# Patient Record
Sex: MALE | Race: OTHER | HISPANIC OR LATINO
[De-identification: names, ages, dates, MRNs, and addresses within clinical notes are randomized per-mention and may not be internally consistent; named-entity substitution may affect disease eponyms.]

---

## 2024-01-05 ENCOUNTER — APPOINTMENT (OUTPATIENT)
Dept: PAIN MANAGEMENT | Facility: CLINIC | Age: 34
End: 2024-01-05

## 2024-01-05 PROBLEM — Z00.00 ENCOUNTER FOR PREVENTIVE HEALTH EXAMINATION: Status: ACTIVE | Noted: 2024-01-05

## 2024-01-09 ENCOUNTER — APPOINTMENT (OUTPATIENT)
Dept: PAIN MANAGEMENT | Facility: CLINIC | Age: 34
End: 2024-01-09
Payer: MEDICAID

## 2024-01-09 VITALS
HEIGHT: 71 IN | SYSTOLIC BLOOD PRESSURE: 140 MMHG | DIASTOLIC BLOOD PRESSURE: 79 MMHG | WEIGHT: 300 LBS | OXYGEN SATURATION: 98 % | BODY MASS INDEX: 42 KG/M2 | HEART RATE: 76 BPM

## 2024-01-09 PROCEDURE — 20553 NJX 1/MLT TRIGGER POINTS 3/>: CPT

## 2024-01-09 PROCEDURE — 99203 OFFICE O/P NEW LOW 30 MIN: CPT | Mod: 25

## 2024-01-09 RX ORDER — MELOXICAM 10 MG/1
10 CAPSULE ORAL
Refills: 0 | Status: ACTIVE | COMMUNITY

## 2024-01-09 RX ORDER — OXYCODONE HYDROCHLORIDE AND ACETAMINOPHEN 5; 325 MG/1; MG/1
5-325 TABLET ORAL
Refills: 0 | Status: ACTIVE | COMMUNITY

## 2024-01-19 ENCOUNTER — APPOINTMENT (OUTPATIENT)
Dept: PAIN MANAGEMENT | Facility: CLINIC | Age: 34
End: 2024-01-19
Payer: MEDICAID

## 2024-01-19 PROCEDURE — 99213 OFFICE O/P EST LOW 20 MIN: CPT | Mod: 95

## 2024-01-19 NOTE — REVIEW OF SYSTEMS
[Back Pain] : back pain [Muscle Pain] : muscle pain [Joint Pain] : joint pain [Decreased ROM] : decreased range of motion

## 2024-01-19 NOTE — REASON FOR VISIT
[Home] : at home, [unfilled] , at the time of the visit. [Medical Office: ([facility])___] : at the medical office located in  [Patient] : the patient [Follow-Up Visit] : a follow-up pain management visit

## 2024-01-22 NOTE — PHYSICAL EXAM
[de-identified] : Physical Exam (Telemedicine):  Gen: AAOX3, NAD HEENT: PERRLA, EOMI, NCAT, NP Pulmonary: No Signs of Respiratory Distress MSK: AROM WFL, Limitations painful truncal ROM Neurological: Grossly neurologically intact Positive modified seated SLR on RIGHT Gait: Normal, Non-antalgic, Sans AD Derm: No Rash, (-)Lesions, (-)Erythema, (-)Cyanosis  Psych: Calm, Cooperative, Conversational  Disclaimer: This is a limited examination for the purposes of conducting a telemedicine visit during the COVID-19 pandemic. Physical exam maneuvers were modified as necessary to allow patient to self perform. A focused physician physical examination will be performed during in person visits and should be referred to to determine need for further testing, interventions or degree of disability.

## 2024-01-22 NOTE — HISTORY OF PRESENT ILLNESS
[Back Pain] : back pain [FreeTextEntry1] : Interval Note:  Since last visit the pain intensity has progressed, expressing frustration  TPI last visit helped his ROM but still with pain with standing and walking  He has completed greater than 6 weeks of physician directed home exercise regiment targeting the lumbar paraspinal and quadratus lumborum muscles, hamstrings, that was provided to him in the past, mid November 2023  MRI has been denied  Moving bowels regularly. No recent falls.   Patient denies any bowel/bladder incontinence, no saddle/perineal anesthesia or any other red flag signs or symptoms.  ---  HPI - Mr. TYREE ROBLES is a 33 year M with PHx as below, referred by FAMILY who presents today with chief complaint of low back pain. Reports that it developed several years ago. It is located lumbar spine;  there is radiation of the pain into the right leg.  The pain is presently 5/10 in severity on the numerical rating scale. It is sharp in nature. The pain is constant. There is diurnal worsening, during the course of the day. The pain is aggravated with standing, walking, bending, lifting twisting. The pain improves with rest. The pain is functionally and emotionally disabling for the patient as its preventing them from going about activities of daily living, such as routine housework. The pain does impair the patients ability to sleep.    Patient attests to 6 weeks of provider directed treatment, including a provider directed stretching regimen, cyclobenzaprine  Patient reports treatment that occurred within the last 3 months  Patient report that symptoms have been persistent and and progressing after treatment    Reports there is NO present numbness, there is NO weakness. Patient denies any bowel/bladder incontinence, no saddle/perineal anesthesia or any other red flag signs or symptoms. Reports regular BMs.

## 2024-01-22 NOTE — DATA REVIEWED
[FreeTextEntry1] : See Media Section  CT lumbar spine L3-4 there appears to be some mild spinal canal stenosis due to broad-based bulging   L4-L5 there is what appears to be a moderate spinal canal stenosis secondary to broad-based disc bulging also appears to be right neuroforaminal stenosis  Impression: No acute lumbar spine process Bilateral adrenal adenomas.

## 2024-01-22 NOTE — ASSESSMENT
[FreeTextEntry1] : Mr. TYREE ROBLES is a 33 year M suffering from low back and RIGHT leg pain, that based upon today's subjective complaints, physical examination, and CT review, is likely secondary to lumbar radiculopathy >> Medications  Chronic opioid use for non-malignant pain, in particular at high doses would not be recommended since it can potentially lead to hyperalgesia (hypersensitivity), tolerance and addiction.    Not amenable   >> Interventions   Consider for Right L5/S1 ILESI   >> Therapy and Other Modalities   Continue with home exercise regiment as tolerated  >> Imaging and Other Studies  MRI Lumbar Spine - remains pending  >> Consults  None ordered

## 2024-02-02 ENCOUNTER — APPOINTMENT (OUTPATIENT)
Dept: PAIN MANAGEMENT | Facility: CLINIC | Age: 34
End: 2024-02-02
Payer: MEDICAID

## 2024-02-02 PROCEDURE — 99212 OFFICE O/P EST SF 10 MIN: CPT

## 2024-02-02 NOTE — ASSESSMENT
[FreeTextEntry1] : Mr. TYREE ROBLES is a 33 year M suffering from low back and RIGHT leg pain, that based upon today's subjective complaints, physical examination, and CT review, is likely secondary to lumbar radiculopathy >> Medications  Chronic opioid use for non-malignant pain, in particular at high doses would not be recommended since it can potentially lead to hyperalgesia (hypersensitivity), tolerance and addiction.  Not amenable  >> Interventions  Consider for Right L5/S1 ILESI  >> Therapy and Other Modalities  Continue with home exercise regiment as tolerated  >> Imaging and Other Studies  MRI Lumbar Spine - remains pending  >> Consults  None ordered.

## 2024-02-02 NOTE — HISTORY OF PRESENT ILLNESS
[FreeTextEntry1] : Interval Note:  The patient has been experiencing persistent low back pain radiating down the right lower extremity, unresponsive to over six weeks of conservative management. Initial symptoms prompted the implementation of a physician-directed home exercise regimen in mid-November 2023, focusing on the lumbar paraspinal and quadratus lumborum muscles, as well as the hamstrings. Despite adherence to this program and engagement [facility] [place], who further emphasized hamstring, quadratus lumborum, and lumbar paraspinal stretches, the patient reports progressive pain intensity without significant relief.  In addition to physical therapy, the patient has been utilizing nonsteroidal anti-inflammatory medication, specifically ibuprofen, as part of the pain management strategy. The patient maintains regular bowel movements and reports no recent falls, suggesting no acute exacerbating factors. ---  TPI  on initial visit - mild relief  ---  HPI - Mr. TYREE ROBLES is a 33 year M with PHx as below, referred by FAMILY who presents today with chief complaint of low back pain. Reports that it developed several years ago. It is located lumbar spine;  there is radiation of the pain into the right leg.  The pain is presently 5/10 in severity on the numerical rating scale. It is sharp in nature. The pain is constant. There is diurnal worsening, during the course of the day. The pain is aggravated with standing, walking, bending, lifting twisting. The pain improves with rest. The pain is functionally and emotionally disabling for the patient as its preventing them from going about activities of daily living, such as routine housework. The pain does impair the patients ability to sleep.    Patient attests to 6 weeks of provider directed treatment, including a provider directed stretching regimen, cyclobenzaprine  Patient reports treatment that occurred within the last 3 months  Patient report that symptoms have been persistent and and progressing after treatment    Reports there is NO present numbness, there is NO weakness. Patient denies any bowel/bladder incontinence, no saddle/perineal anesthesia or any other red flag signs or symptoms. Reports regular BMs.    [Back Pain] : back pain [6] : a current pain level of 6/10

## 2024-02-02 NOTE — PHYSICAL EXAM
[de-identified] : Physical Exam (Telemedicine):  Gen: AAOX3, NAD HEENT: PERRLA, EOMI, NCAT, NP Pulmonary: No Signs of Respiratory Distress MSK: AROM WFL, Limitations painful truncal ROM Neurological: Grossly neurologically intact There is a POSITIVE modified seated SLR on RIGHT Gait: Normal, Non-antalgic, Sans AD Derm: No Rash, (-)Lesions, (-)Erythema, (-)Cyanosis  Psych: Calm, Cooperative, Conversational  Disclaimer: This is a limited examination for the purposes of conducting a telemedicine visit during the COVID-19 pandemic. Physical exam maneuvers were modified as necessary to allow patient to self perform. A focused physician physical examination will be performed during in person visits and should be referred to to determine need for further testing, interventions or degree of disability.

## 2024-02-27 ENCOUNTER — APPOINTMENT (OUTPATIENT)
Dept: PAIN MANAGEMENT | Facility: CLINIC | Age: 34
End: 2024-02-27

## 2024-03-26 ENCOUNTER — RESULT REVIEW (OUTPATIENT)
Age: 34
End: 2024-03-26

## 2024-03-27 ENCOUNTER — APPOINTMENT (OUTPATIENT)
Dept: PAIN MANAGEMENT | Facility: CLINIC | Age: 34
End: 2024-03-27
Payer: MEDICAID

## 2024-03-27 VITALS
HEART RATE: 66 BPM | SYSTOLIC BLOOD PRESSURE: 134 MMHG | HEIGHT: 71 IN | DIASTOLIC BLOOD PRESSURE: 80 MMHG | BODY MASS INDEX: 42 KG/M2 | OXYGEN SATURATION: 98 % | WEIGHT: 300 LBS

## 2024-03-27 DIAGNOSIS — M54.16 RADICULOPATHY, LUMBAR REGION: ICD-10-CM

## 2024-03-27 PROCEDURE — 99214 OFFICE O/P EST MOD 30 MIN: CPT | Mod: 25

## 2024-03-27 PROCEDURE — G2211 COMPLEX E/M VISIT ADD ON: CPT | Mod: NC,1L

## 2024-03-27 PROCEDURE — 20610 DRAIN/INJ JOINT/BURSA W/O US: CPT | Mod: RT

## 2024-03-27 NOTE — HISTORY OF PRESENT ILLNESS
[Back Pain] : back pain [FreeTextEntry1] : Interval Note:   Right shoulder has been bothersome, impainrg his abliity to go about overhead activities  Having ongoing pain in the back   Remains focused home strething regimen on the lumbar paraspinal and quadratus lumborum muscles, as well as the hamstrings. Despite adherence to this program and engagement with [facility] [place], who further emphasized hamstring, quadratus lumborum, and lumbar paraspinal stretches, the patient reports progressive pain intensity without significant relief.  In addition to physical therapy, the patient has been utilizing nonsteroidal anti-inflammatory medication, specifically ibuprofen, as part of the pain management strategy. The patient maintains regular bowel movements and reports no recent falls, suggesting no acute exacerbating factors. ---  TPI  on initial visit - mild relief  ---  HPI - Mr. TYREE ROBLES is a 33 year M with PHx as below, referred by FAMILY who presents today with chief complaint of low back pain. Reports that it developed several years ago. It is located lumbar spine;  there is radiation of the pain into the right leg.  The pain is presently 5/10 in severity on the numerical rating scale. It is sharp in nature. The pain is constant. There is diurnal worsening, during the course of the day. The pain is aggravated with standing, walking, bending, lifting twisting. The pain improves with rest. The pain is functionally and emotionally disabling for the patient as its preventing them from going about activities of daily living, such as routine housework. The pain does impair the patients ability to sleep.    Patient attests to 6 weeks of provider directed treatment, including a provider directed stretching regimen, cyclobenzaprine  Patient reports treatment that occurred within the last 3 months  Patient report that symptoms have been persistent and and progressing after treatment    Reports there is NO present numbness, there is NO weakness. Patient denies any bowel/bladder incontinence, no saddle/perineal anesthesia or any other red flag signs or symptoms. Reports regular BMs.

## 2024-03-27 NOTE — REVIEW OF SYSTEMS
[Back Pain] : back pain [Muscle Pain] : muscle pain [Radiating Pain] : radiating pain [Decreased ROM] : decreased range of motion

## 2024-03-27 NOTE — ASSESSMENT
[FreeTextEntry1] : Mr. TYREE ROBLES is a 33 year M suffering from low back and RIGHT leg pain, that based upon today's subjective complaints, physical examination, and CT review, is likely secondary to lumbar radiculopathy >> Medications  Chronic opioid use for non-malignant pain, in particular at high doses would not be recommended since it can potentially lead to hyperalgesia (hypersensitivity), tolerance and addiction.  Not amenable  >> Interventions   Right subacromial bursa injection for right shoulder impingement  Consider for Right L5/S1 ILESI  >> Therapy and Other Modalities  Continue with home exercise regiment as tolerated  >> Imaging and Other Studies  MRI Lumbar Spine - remains pending  >> Consults  None ordered.     --   Procedure Note: Injection of subacromial Bursae   DIAGNOSIS: Subacromial bursitis, RIGHT   *Informed consent was obtained. The nature of the procedure and its benefits, risk of and reasonable alternative were discussed with the patient. Risks are dependent on the extensiveness of the procedure and include, but are not limited to, bleeding, infection, and pain.  Patient states they understands, has no further questions and agrees to proceed.   *Time Out Completed. Consent obtained. Verified correct patient, correct procedure and correct site.     PROCEDURE: The RIGHT subacromial area was prepped with alcohol times three in a sterile fashion.  A solution containing Kenalog 40 mg and 4 ml of 1% Lidocaine was injected in the RIGHT subacromial bursae after negative aspiration.  Bleeding was checked. A sterile bandage was placed.   The patient tolerated the procedure well.  There were no immediate complications.  The patient was observed for 15 minutes and discharged.  The patient was told to apply ice at the injection site and if there is any problem, call my office or report to the ER.

## 2024-03-27 NOTE — PHYSICAL EXAM
[de-identified] : General: AAOx3, NAD HEENT: EOMI, Sclera white CVS: +2 Pulses Pulmonary: No Respiratory Distress Abdomen: Soft, NT, ND MSK: Moving all extremities against gravity RIGHT Shoulder Exam ROM: painful in flexion Empty can sign:  positive Anderson exam: positive Scarf test:  positiuve AC Joint Tenderness:negative Neuro: Sensation I to LT Extremities: (-)Edema Derm: No Rash Psych: Calm, Cooperative

## 2024-04-17 ENCOUNTER — APPOINTMENT (OUTPATIENT)
Dept: PAIN MANAGEMENT | Facility: CLINIC | Age: 34
End: 2024-04-17
Payer: MEDICAID

## 2024-04-17 PROCEDURE — 99214 OFFICE O/P EST MOD 30 MIN: CPT

## 2024-04-17 NOTE — ASSESSMENT
[FreeTextEntry1] : Mr. TYREE ROBLES is a 33 year M suffering from low back and RIGHT leg pain, that based upon today's subjective complaints, physical examination, and CT review, is likely secondary to lumbar radiculopathy  Right subacromial bursistis - resolved  >> Medications  Chronic opioid use for non-malignant pain, in particular at high doses would not be recommended since it can potentially lead to hyperalgesia (hypersensitivity), tolerance and addiction.    >> Interventions   Consider for Right L5/S1 ILESI  >> Therapy and Other Modalities  Continue with home exercise regiment as tolerated  >> Imaging and Other Studies  MRI Lumbar Spine - remains pending  >> Consults  None ordered.

## 2024-04-17 NOTE — PHYSICAL EXAM
[de-identified] : Physical Exam (Telemedicine):  Gen: AAOX3, NAD HEENT: PERRLA, EOMI, NCAT, NP Pulmonary: No Signs of Respiratory Distress MSK: AROM WFL, Limitations none Neurological: Grossly neurologically intact, Noted deficits none Gait: Normal, Non-antalgic, Sans AD Derm: No Rash, (-)Lesions, (-)Erythema, (-)Cyanosis  Psych: Calm, Cooperative, Conversational  Disclaimer: This is a limited examination for the purposes of conducting a telemedicine visit during the COVID-19 pandemic. Physical exam maneuvers were modified as necessary to allow patient to self perform. A focused physician physical examination will be performed during in person visits and should be referred to to determine need for further testing, interventions or degree of disability.

## 2024-04-17 NOTE — REASON FOR VISIT
[Home] : at home, [unfilled] , at the time of the visit. [Medical Office: ([facility])___] : at the medical office located in  [Patient] : the patient [Follow-Up Visit] : a follow-up pain management visit

## 2024-04-17 NOTE — HISTORY OF PRESENT ILLNESS
[FreeTextEntry1] : Interval Note:  Had RIGHT  Subacromial bursa injection 03/27/2024 - feeling much better  In addition to physical therapy, the patient has been utilizing nonsteroidal anti-inflammatory medication, specifically ibuprofen, as part of the pain management strategy. The patient maintains regular bowel movements and reports no recent falls, suggesting no acute exacerbating factors. ---  TPI  on initial visit - mild relief  ---  HPI - Mr. TYREE ROBLES is a 33 year M with PHx as below, referred by FAMILY who presents today with chief complaint of low back pain. Reports that it developed several years ago. It is located lumbar spine;  there is radiation of the pain into the right leg.  The pain is presently 5/10 in severity on the numerical rating scale. It is sharp in nature. The pain is constant. There is diurnal worsening, during the course of the day. The pain is aggravated with standing, walking, bending, lifting twisting. The pain improves with rest. The pain is functionally and emotionally disabling for the patient as its preventing them from going about activities of daily living, such as routine housework. The pain does impair the patients ability to sleep.    Patient attests to 6 weeks of provider directed treatment, including a provider directed stretching regimen, cyclobenzaprine  Patient reports treatment that occurred within the last 3 months  Patient report that symptoms have been persistent and and progressing after treatment    Reports there is NO present numbness, there is NO weakness. Patient denies any bowel/bladder incontinence, no saddle/perineal anesthesia or any other red flag signs or symptoms. Reports regular BMs.

## 2024-06-12 ENCOUNTER — APPOINTMENT (OUTPATIENT)
Dept: PAIN MANAGEMENT | Facility: CLINIC | Age: 34
End: 2024-06-12

## 2024-06-12 DIAGNOSIS — M25.511 PAIN IN RIGHT SHOULDER: ICD-10-CM

## 2024-06-19 ENCOUNTER — APPOINTMENT (OUTPATIENT)
Dept: PAIN MANAGEMENT | Facility: CLINIC | Age: 34
End: 2024-06-19
Payer: MEDICAID

## 2024-06-19 DIAGNOSIS — M75.51 BURSITIS OF RIGHT SHOULDER: ICD-10-CM

## 2024-06-19 PROCEDURE — 99212 OFFICE O/P EST SF 10 MIN: CPT

## 2024-06-19 NOTE — PHYSICAL EXAM
[de-identified] : Physical Exam (Telemedicine):  Gen: AAOX3, NAD HEENT: PERRLA, EOMI, NCAT, NP Pulmonary: No Signs of Respiratory Distress MSK: AROM WFL, Limitations limited ROM Neurological: Grossly neurologically intact, Noted deficits none Gait: Normal, Non-antalgic, Sans AD Derm: No Rash, (-)Lesions, (-)Erythema, (-)Cyanosis  Psych: Calm, Cooperative, Conversational  Disclaimer: This is a limited examination for the purposes of conducting a telemedicine visit during the COVID-19 pandemic. Physical exam maneuvers were modified as necessary to allow patient to self perform. A focused physician physical examination will be performed during in person visits and should be referred to to determine need for further testing, interventions or degree of disability.

## 2024-06-19 NOTE — ASSESSMENT
[FreeTextEntry1] : Mr. TYREE ROBLES is a 33 year M suffering from low back and RIGHT leg pain, that based upon today's subjective complaints, physical examination, and CT review, is likely secondary to lumbar radiculopathy >> Medications  Chronic opioid use for non-malignant pain, in particular at high doses would not be recommended since it can potentially lead to hyperalgesia (hypersensitivity), tolerance and addiction.  Not amenable  >> Interventions   Right subacromial bursa injection for right shoulder impingement  Consider for Right L5/S1 ILESI  >> Therapy and Other Modalities  Continue with home exercise regiment as tolerated  >> Imaging and Other Studies  MRI Lumbar Spine - remains pending  >> Consults  None ordered.

## 2024-06-19 NOTE — HISTORY OF PRESENT ILLNESS
[FreeTextEntry1] : Interval Note:  Since last visit the pain intensity has persisted in the low back  Trialed Ibuprofen ( NSAID) no relief  Moving bowels regularly. No recent falls.   Shoulder pain remains improved  Patient denies any bowel/bladder incontinence, no saddle/perineal anesthesia or any other red flag signs or symptoms.   ---  RIGHT  Subacromial bursa injection 03/27/2024 - feeling much better  TPI  on initial visit - mild relief  ---  HPI - Mr. TYREE ROBLES is a 33 year M with PHx as below, referred by FAMILY who presents today with chief complaint of low back pain. Reports that it developed several years ago. It is located lumbar spine;  there is radiation of the pain into the right leg.  The pain is presently 5/10 in severity on the numerical rating scale. It is sharp in nature. The pain is constant. There is diurnal worsening, during the course of the day. The pain is aggravated with standing, walking, bending, lifting twisting. The pain improves with rest. The pain is functionally and emotionally disabling for the patient as its preventing them from going about activities of daily living, such as routine housework. The pain does impair the patients ability to sleep.    Patient attests to 6 weeks of provider directed treatment, including a provider directed stretching regimen, cyclobenzaprine  Patient reports treatment that occurred within the last 3 months  Patient report that symptoms have been persistent and and progressing after treatment    Reports there is NO present numbness, there is NO weakness. Patient denies any bowel/bladder incontinence, no saddle/perineal anesthesia or any other red flag signs or symptoms. Reports regular BMs.    [Back Pain] : back pain

## 2024-06-19 NOTE — REASON FOR VISIT
[Home] : at home, [unfilled] , at the time of the visit. [Medical Office: ([facility])___] : at the medical office located in  [Patient] : the patient [Follow-Up Visit] : a follow-up pain management visit

## 2024-12-11 ENCOUNTER — APPOINTMENT (OUTPATIENT)
Dept: PAIN MANAGEMENT | Facility: CLINIC | Age: 34
End: 2024-12-11
Payer: MEDICAID

## 2024-12-11 DIAGNOSIS — M54.16 RADICULOPATHY, LUMBAR REGION: ICD-10-CM

## 2024-12-11 PROCEDURE — 99212 OFFICE O/P EST SF 10 MIN: CPT

## 2025-01-03 ENCOUNTER — APPOINTMENT (OUTPATIENT)
Dept: PAIN MANAGEMENT | Facility: CLINIC | Age: 35
End: 2025-01-03

## 2025-01-03 VITALS
WEIGHT: 297 LBS | SYSTOLIC BLOOD PRESSURE: 143 MMHG | DIASTOLIC BLOOD PRESSURE: 87 MMHG | OXYGEN SATURATION: 98 % | HEART RATE: 75 BPM | HEIGHT: 71 IN | BODY MASS INDEX: 41.58 KG/M2

## 2025-01-03 DIAGNOSIS — M79.10 MYALGIA, UNSPECIFIED SITE: ICD-10-CM

## 2025-01-03 PROCEDURE — 20552 NJX 1/MLT TRIGGER POINT 1/2: CPT

## 2025-02-12 ENCOUNTER — APPOINTMENT (OUTPATIENT)
Dept: UROLOGY | Facility: CLINIC | Age: 35
End: 2025-02-12